# Patient Record
Sex: MALE | Race: BLACK OR AFRICAN AMERICAN | NOT HISPANIC OR LATINO | URBAN - METROPOLITAN AREA
[De-identification: names, ages, dates, MRNs, and addresses within clinical notes are randomized per-mention and may not be internally consistent; named-entity substitution may affect disease eponyms.]

---

## 2021-07-20 ENCOUNTER — EMERGENCY (EMERGENCY)
Facility: HOSPITAL | Age: 51
LOS: 1 days | Discharge: HOME | End: 2021-07-20
Attending: EMERGENCY MEDICINE | Admitting: EMERGENCY MEDICINE
Payer: COMMERCIAL

## 2021-07-20 VITALS
RESPIRATION RATE: 20 BRPM | HEIGHT: 75 IN | SYSTOLIC BLOOD PRESSURE: 156 MMHG | WEIGHT: 309.97 LBS | DIASTOLIC BLOOD PRESSURE: 86 MMHG | HEART RATE: 94 BPM | OXYGEN SATURATION: 98 % | TEMPERATURE: 98 F

## 2021-07-20 VITALS
RESPIRATION RATE: 17 BRPM | DIASTOLIC BLOOD PRESSURE: 73 MMHG | OXYGEN SATURATION: 98 % | HEART RATE: 72 BPM | TEMPERATURE: 98 F | SYSTOLIC BLOOD PRESSURE: 113 MMHG

## 2021-07-20 LAB
ALBUMIN SERPL ELPH-MCNC: 3.3 G/DL — SIGNIFICANT CHANGE UP (ref 3.3–5)
ALP SERPL-CCNC: 80 U/L — SIGNIFICANT CHANGE UP (ref 30–120)
ALT FLD-CCNC: 30 U/L DA — SIGNIFICANT CHANGE UP (ref 10–60)
ANION GAP SERPL CALC-SCNC: 7 MMOL/L — SIGNIFICANT CHANGE UP (ref 5–17)
APPEARANCE UR: CLEAR — SIGNIFICANT CHANGE UP
APTT BLD: 33.5 SEC — SIGNIFICANT CHANGE UP (ref 27.5–35.5)
AST SERPL-CCNC: 17 U/L — SIGNIFICANT CHANGE UP (ref 10–40)
BASOPHILS # BLD AUTO: 0.04 K/UL — SIGNIFICANT CHANGE UP (ref 0–0.2)
BASOPHILS NFR BLD AUTO: 0.5 % — SIGNIFICANT CHANGE UP (ref 0–2)
BILIRUB SERPL-MCNC: 0.4 MG/DL — SIGNIFICANT CHANGE UP (ref 0.2–1.2)
BILIRUB UR-MCNC: NEGATIVE — SIGNIFICANT CHANGE UP
BUN SERPL-MCNC: 11 MG/DL — SIGNIFICANT CHANGE UP (ref 7–23)
CALCIUM SERPL-MCNC: 8.9 MG/DL — SIGNIFICANT CHANGE UP (ref 8.4–10.5)
CHLORIDE SERPL-SCNC: 100 MMOL/L — SIGNIFICANT CHANGE UP (ref 96–108)
CO2 SERPL-SCNC: 25 MMOL/L — SIGNIFICANT CHANGE UP (ref 22–31)
COLOR SPEC: YELLOW — SIGNIFICANT CHANGE UP
CREAT SERPL-MCNC: 0.97 MG/DL — SIGNIFICANT CHANGE UP (ref 0.5–1.3)
DIFF PNL FLD: NEGATIVE — SIGNIFICANT CHANGE UP
EOSINOPHIL # BLD AUTO: 0.32 K/UL — SIGNIFICANT CHANGE UP (ref 0–0.5)
EOSINOPHIL NFR BLD AUTO: 4 % — SIGNIFICANT CHANGE UP (ref 0–6)
GLUCOSE BLDC GLUCOMTR-MCNC: 146 MG/DL — HIGH (ref 70–99)
GLUCOSE SERPL-MCNC: 147 MG/DL — HIGH (ref 70–99)
GLUCOSE UR QL: NEGATIVE MG/DL — SIGNIFICANT CHANGE UP
HCT VFR BLD CALC: 43.3 % — SIGNIFICANT CHANGE UP (ref 39–50)
HGB BLD-MCNC: 14.1 G/DL — SIGNIFICANT CHANGE UP (ref 13–17)
IMM GRANULOCYTES NFR BLD AUTO: 0.3 % — SIGNIFICANT CHANGE UP (ref 0–1.5)
INR BLD: 1.04 RATIO — SIGNIFICANT CHANGE UP (ref 0.88–1.16)
KETONES UR-MCNC: NEGATIVE — SIGNIFICANT CHANGE UP
LEUKOCYTE ESTERASE UR-ACNC: NEGATIVE — SIGNIFICANT CHANGE UP
LYMPHOCYTES # BLD AUTO: 3.26 K/UL — SIGNIFICANT CHANGE UP (ref 1–3.3)
LYMPHOCYTES # BLD AUTO: 40.8 % — SIGNIFICANT CHANGE UP (ref 13–44)
MCHC RBC-ENTMCNC: 28.3 PG — SIGNIFICANT CHANGE UP (ref 27–34)
MCHC RBC-ENTMCNC: 32.6 GM/DL — SIGNIFICANT CHANGE UP (ref 32–36)
MCV RBC AUTO: 86.9 FL — SIGNIFICANT CHANGE UP (ref 80–100)
MONOCYTES # BLD AUTO: 0.65 K/UL — SIGNIFICANT CHANGE UP (ref 0–0.9)
MONOCYTES NFR BLD AUTO: 8.1 % — SIGNIFICANT CHANGE UP (ref 2–14)
NEUTROPHILS # BLD AUTO: 3.7 K/UL — SIGNIFICANT CHANGE UP (ref 1.8–7.4)
NEUTROPHILS NFR BLD AUTO: 46.3 % — SIGNIFICANT CHANGE UP (ref 43–77)
NITRITE UR-MCNC: NEGATIVE — SIGNIFICANT CHANGE UP
NRBC # BLD: 0 /100 WBCS — SIGNIFICANT CHANGE UP (ref 0–0)
PH UR: 5 — SIGNIFICANT CHANGE UP (ref 5–8)
PLATELET # BLD AUTO: 295 K/UL — SIGNIFICANT CHANGE UP (ref 150–400)
POTASSIUM SERPL-MCNC: 4 MMOL/L — SIGNIFICANT CHANGE UP (ref 3.5–5.3)
POTASSIUM SERPL-SCNC: 4 MMOL/L — SIGNIFICANT CHANGE UP (ref 3.5–5.3)
PROT SERPL-MCNC: 7.2 G/DL — SIGNIFICANT CHANGE UP (ref 6–8.3)
PROT UR-MCNC: NEGATIVE MG/DL — SIGNIFICANT CHANGE UP
PROTHROM AB SERPL-ACNC: 12.6 SEC — SIGNIFICANT CHANGE UP (ref 10.6–13.6)
RBC # BLD: 4.98 M/UL — SIGNIFICANT CHANGE UP (ref 4.2–5.8)
RBC # FLD: 13 % — SIGNIFICANT CHANGE UP (ref 10.3–14.5)
SODIUM SERPL-SCNC: 132 MMOL/L — LOW (ref 135–145)
SP GR SPEC: 1 — LOW (ref 1.01–1.02)
TROPONIN I SERPL-MCNC: 0 NG/ML — LOW (ref 0.02–0.06)
UROBILINOGEN FLD QL: NEGATIVE MG/DL — SIGNIFICANT CHANGE UP
WBC # BLD: 7.99 K/UL — SIGNIFICANT CHANGE UP (ref 3.8–10.5)
WBC # FLD AUTO: 7.99 K/UL — SIGNIFICANT CHANGE UP (ref 3.8–10.5)

## 2021-07-20 PROCEDURE — 85610 PROTHROMBIN TIME: CPT

## 2021-07-20 PROCEDURE — 71045 X-RAY EXAM CHEST 1 VIEW: CPT | Mod: 26

## 2021-07-20 PROCEDURE — 36415 COLL VENOUS BLD VENIPUNCTURE: CPT

## 2021-07-20 PROCEDURE — 81003 URINALYSIS AUTO W/O SCOPE: CPT

## 2021-07-20 PROCEDURE — 85025 COMPLETE CBC W/AUTO DIFF WBC: CPT

## 2021-07-20 PROCEDURE — 70450 CT HEAD/BRAIN W/O DYE: CPT

## 2021-07-20 PROCEDURE — 71045 X-RAY EXAM CHEST 1 VIEW: CPT

## 2021-07-20 PROCEDURE — 99285 EMERGENCY DEPT VISIT HI MDM: CPT

## 2021-07-20 PROCEDURE — 85730 THROMBOPLASTIN TIME PARTIAL: CPT

## 2021-07-20 PROCEDURE — 80053 COMPREHEN METABOLIC PANEL: CPT

## 2021-07-20 PROCEDURE — 84484 ASSAY OF TROPONIN QUANT: CPT

## 2021-07-20 PROCEDURE — 93010 ELECTROCARDIOGRAM REPORT: CPT

## 2021-07-20 PROCEDURE — 82962 GLUCOSE BLOOD TEST: CPT

## 2021-07-20 PROCEDURE — 99284 EMERGENCY DEPT VISIT MOD MDM: CPT | Mod: 25

## 2021-07-20 PROCEDURE — 93005 ELECTROCARDIOGRAM TRACING: CPT

## 2021-07-20 PROCEDURE — 70450 CT HEAD/BRAIN W/O DYE: CPT | Mod: 26,MA

## 2021-07-20 RX ORDER — SODIUM CHLORIDE 9 MG/ML
500 INJECTION INTRAMUSCULAR; INTRAVENOUS; SUBCUTANEOUS ONCE
Refills: 0 | Status: COMPLETED | OUTPATIENT
Start: 2021-07-20 | End: 2021-07-20

## 2021-07-20 RX ADMIN — SODIUM CHLORIDE 500 MILLILITER(S): 9 INJECTION INTRAMUSCULAR; INTRAVENOUS; SUBCUTANEOUS at 10:51

## 2021-07-20 RX ADMIN — SODIUM CHLORIDE 500 MILLILITER(S): 9 INJECTION INTRAMUSCULAR; INTRAVENOUS; SUBCUTANEOUS at 11:06

## 2021-07-20 NOTE — ED PROVIDER NOTE - OBJECTIVE STATEMENT
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 Felt like he might pass out. Now feels better. Thinks he might have had an anxiety attack. Denies fever, headache, NV, visual disturbance, CP, SOB, diarrhea or other symptom. Did not take Metformin today.  PCP Eliazar in [facility].

## 2021-07-20 NOTE — ED PROVIDER NOTE - PATIENT PORTAL LINK FT
You can access the FollowMyHealth Patient Portal offered by [facility] by registering at the following website: [email or web address]. By joining [employer]’s FollowMyHealth portal, you will also be able to view your health information using other applications (apps) compatible with our system.

## 2021-07-20 NOTE — ED ADULT NURSE NOTE - OBJECTIVE STATEMENT
felt like I was going to pass out today at work, it's humid out and freaked out, have an appt with my doctor in few days

## 2021-07-20 NOTE — ED ADULT NURSE NOTE - NS ED NOTE ABUSE SUSPICION NEGLECT YN
Star Wedge Flap Text: The defect edges were debeveled with a #15 scalpel blade.  Given the location of the defect, shape of the defect and the proximity to free margins a star wedge flap was deemed most appropriate.  Using a sterile surgical marker, an appropriate rotation flap was drawn incorporating the defect and placing the expected incisions within the relaxed skin tension lines where possible. The area thus outlined was incised deep to adipose tissue with a #15 scalpel blade.  The skin margins were undermined to an appropriate distance in all directions utilizing iris scissors. No

## 2021-07-20 NOTE — ED PROVIDER NOTE - ENMT, MLM
Airway patent, Nasal mucosa clear. Mouth with normal mucosa. Throat has no vesicles, no oropharyngeal exudates and uvula is midline.  No nystagmus

## 2021-07-20 NOTE — ED PROVIDER NOTE - PROGRESS NOTE DETAILS
Feels well. Labs, CT, EKG normal. Plan - DC home with near syncope instructions. FU PCP tomorrow for another exam

## 2021-07-20 NOTE — ED ADULT NURSE NOTE - NSIMPLEMENTINTERV_GEN_ALL_ED
Implemented All Universal Safety Interventions:  [place] to call system. Call bell, personal items and telephone within reach. Instruct patient to call for assistance. Room bathroom lighting operational. Non-slip footwear when patient is off stretcher. Physically safe environment: no spills, clutter or unnecessary equipment. Stretcher in lowest position, wheels locked, appropriate side rails in place.

## 2022-04-08 NOTE — ED ADULT TRIAGE NOTE - INTERNATIONAL TRAVEL
04/07/22 1946   Vital Signs   Temp 97 °F (36.1 °C)   Temp Source Oral   Pulse 83   Heart Rate Source Monitor   Resp 20   BP 97/66   BP Location Right upper arm   Patient Position Semi fowlers   Level of Consciousness Alert (0)   MEWS Score 2   Oxygen Therapy   SpO2 97 %   O2 Device Nasal cannula   O2 Flow Rate (L/min) 2 L/min   Pt assessment complete. Pt lying in bed quietly. Lung sounds diminished. Pt on 02 2 liters. Bill catheter in place draining clear yellow urine. Rectal tube in place. No leakage noted. Nightly medications given with pudding. Pt denies any further needs at this time. Call light within reach. Bed exit alarm on. No

## 2022-05-02 ENCOUNTER — EMERGENCY (EMERGENCY)
Facility: HOSPITAL | Age: 52
LOS: 1 days | Discharge: HOME | End: 2022-05-02
Attending: EMERGENCY MEDICINE | Admitting: EMERGENCY MEDICINE
Payer: COMMERCIAL

## 2022-05-02 VITALS
WEIGHT: 315 LBS | DIASTOLIC BLOOD PRESSURE: 97 MMHG | TEMPERATURE: 98 F | SYSTOLIC BLOOD PRESSURE: 140 MMHG | HEART RATE: 122 BPM | HEIGHT: 75 IN | RESPIRATION RATE: 16 BRPM | OXYGEN SATURATION: 98 %

## 2022-05-02 VITALS
DIASTOLIC BLOOD PRESSURE: 81 MMHG | OXYGEN SATURATION: 98 % | HEART RATE: 102 BPM | SYSTOLIC BLOOD PRESSURE: 131 MMHG | RESPIRATION RATE: 20 BRPM | TEMPERATURE: 99 F

## 2022-05-02 PROCEDURE — 73630 X-RAY EXAM OF FOOT: CPT | Mod: 26,LT

## 2022-05-02 PROCEDURE — 73630 X-RAY EXAM OF FOOT: CPT

## 2022-05-02 PROCEDURE — 99283 EMERGENCY DEPT VISIT LOW MDM: CPT | Mod: 25

## 2022-05-02 PROCEDURE — 29515 APPLICATION SHORT LEG SPLINT: CPT | Mod: LT

## 2022-05-02 PROCEDURE — 99284 EMERGENCY DEPT VISIT MOD MDM: CPT | Mod: 57

## 2022-05-02 PROCEDURE — 28470 CLTX METATARSAL FX WO MNP EA: CPT | Mod: 54

## 2022-05-02 RX ORDER — METFORMIN HYDROCHLORIDE 850 MG/1
0 TABLET ORAL
Qty: 0 | Refills: 0 | DISCHARGE

## 2022-05-02 RX ORDER — OXYCODONE AND ACETAMINOPHEN 5; 325 MG/1; MG/1
1 TABLET ORAL ONCE
Refills: 0 | Status: DISCONTINUED | OUTPATIENT
Start: 2022-05-02 | End: 2022-05-02

## 2022-05-02 RX ORDER — ATORVASTATIN CALCIUM 80 MG/1
1 TABLET, FILM COATED ORAL
Qty: 0 | Refills: 0 | DISCHARGE

## 2022-05-02 RX ADMIN — OXYCODONE AND ACETAMINOPHEN 1 TABLET(S): 5; 325 TABLET ORAL at 09:46

## 2022-05-02 RX ADMIN — OXYCODONE AND ACETAMINOPHEN 1 TABLET(S): 5; 325 TABLET ORAL at 10:30

## 2022-05-02 NOTE — ED PROVIDER NOTE - PATIENT PORTAL LINK FT
You can access the FollowMyHealth Patient Portal offered by [facility] by registering at the following website: [email or web address]. By joining [employer]’s FollowMyHealth portal, you will also be able to view your health information using other applications (apps) compatible with our system.

## 2022-05-02 NOTE — ED PROVIDER NOTE - NSFOLLOWUPINSTRUCTIONS_ED_ALL_ED_FT
1) Follow-up with Orthopedics, See referred doctor. Call today for close, prompt follow-up.  2) Return to Emergency room for any worsening or persistent pain, weakness, numbness, fever, color change to extremity, or any other concerning symptoms.  3) See attached instruction sheets for additional information, including information regarding signs and symptoms to look out for, reasons to seek immediate care and other important instructions.  4) Rest, Ice, Elevate injured area  5) Wear splint as discussed, use crutches if given   6) Follow-up with your primary care doctor as well as soon as possible 1) Follow-up with Orthopedics, See referred doctor. Call today for close, prompt follow-up.  2) Return to Emergency room for any worsening or persistent pain, weakness, numbness, fever, color change to extremity, or any other concerning symptoms.  3) See attached instruction sheets for additional information, including information regarding signs and symptoms to look out for, reasons to seek immediate care and other important instructions.  4) Rest, Ice, Elevate injured area  5) Wear splint as discussed, use crutches  6) Follow-up with your primary care doctor as well as soon as possible 1) Follow-up with Orthopedics, See referred doctor. Call today for close, prompt follow-up.  2) Return to Emergency room for any worsening or persistent pain, weakness, numbness, fever, color change to extremity, or any other concerning symptoms.  3) See attached instruction sheets for additional information, including information regarding signs and symptoms to look out for, reasons to seek immediate care and other important instructions.  4) Rest, Ice, Elevate injured area  5) Wear splint as discussed, use crutches  6) Follow-up with your primary care doctor as well as soon as possible  7) Percocet as needed for pain, no driving

## 2022-05-02 NOTE — ED PROVIDER NOTE - MUSCULOSKELETAL, MLM
Spine appears normal, no spinal tend (c,t,l).  range of motion is not limited, no muscle or joint tenderness otherwise noted

## 2022-05-02 NOTE — ED PROVIDER NOTE - PHYSICAL EXAMINATION
L foot: pos mild swelling to distal aspect of L foot, distal metatarsals   Pos tend to dist mid / lat metatarsals. nl toes.  Nl dist str/sens equal bl, nl prox 5th mt. Nl anlke / tibfib / knee  2+ pulses, nl cap refill

## 2022-05-02 NOTE — ED ADULT NURSE NOTE - INTERVENTIONS DEFINITIONS
[place] to call system/Call bell, personal items and telephone within reach/Instruct patient to call for assistance/Stretcher in lowest position, wheels locked, appropriate side rails in place

## 2022-05-02 NOTE — ED PROVIDER NOTE - PROGRESS NOTE DETAILS
Rg Samayoa who reviewed XRs- splint, outpt fu in office  Discussed with Patient regarding the X-ray findings.  Discussed the risks of secondary fracture or ligamentous/tendon injury. Discussed the importance of close prompt follow-up with Orthopaedics for definitive workup and treatment, possible surgical repair of his inj.  Also discussed injury / neuro precautions.  Verbalization of understanding of all instructions was shown and an opportunity was given for questions.

## 2022-05-02 NOTE — ED PROVIDER NOTE - CARE PROVIDER_API CALL
Mauro Samayoa)  Orthopaedic Surgery  [address] [place] [address]  Phone: [phone]  Fax: [phone]  Follow Up Time:

## 2022-05-02 NOTE — ED PROVIDER NOTE - OBJECTIVE STATEMENT
53 yo M w/ hx DM p/w L foot pain sp stepped down onto wooden block. pt co pain to distal aspect of foot. no toe pain. no numb/ting/focal weak. mild swelling. occurred ~ 1.5 hours pta. no fall / head inj. no loc. No neck /back pain. no other acute co or inj. Pt vaccinated for covid.

## 2022-05-02 NOTE — ED ADULT TRIAGE NOTE - CHIEF COMPLAINT QUOTE
" I stepped on a wooden block and my left foot started hurting. This happened while at work this morning "

## 2023-03-29 ENCOUNTER — EMERGENCY (EMERGENCY)
Facility: HOSPITAL | Age: 53
LOS: 1 days | Discharge: HOME | End: 2023-03-29
Attending: EMERGENCY MEDICINE | Admitting: EMERGENCY MEDICINE
Payer: COMMERCIAL

## 2023-03-29 VITALS
TEMPERATURE: 99 F | HEIGHT: 75 IN | HEART RATE: 100 BPM | RESPIRATION RATE: 18 BRPM | SYSTOLIC BLOOD PRESSURE: 142 MMHG | WEIGHT: 309.97 LBS | DIASTOLIC BLOOD PRESSURE: 98 MMHG | OXYGEN SATURATION: 98 %

## 2023-03-29 PROCEDURE — 99284 EMERGENCY DEPT VISIT MOD MDM: CPT

## 2023-03-29 PROCEDURE — 99053 MED SERV 10PM-8AM 24 HR FAC: CPT

## 2023-03-29 PROCEDURE — 99284 EMERGENCY DEPT VISIT MOD MDM: CPT | Mod: 25

## 2023-03-29 PROCEDURE — 93971 EXTREMITY STUDY: CPT | Mod: 26,RT

## 2023-03-29 PROCEDURE — 93971 EXTREMITY STUDY: CPT

## 2023-03-29 NOTE — ED PROVIDER NOTE - CLINICAL SUMMARY MEDICAL DECISION MAKING FREE TEXT BOX
Swishing pain in right thigh.  Physical exam unrevealing, will rule out DVT otherwise likely musculoskeletal.  If Doppler negative follow-up orthopedics outpatient.

## 2023-03-29 NOTE — ED ADULT TRIAGE NOTE - NS ED TRIAGE AVPU SCALE
This evaluation was conducted via Zoom using secure and encrypted videoconferencing technology. The patient was in a private location in the [facility].    The patient's identity was confirmed and verbal consent was obtained for this virtual visit.     PSYCHIATRY FOLLOW-UP NOTE      Name: Kulwant Schultz  MRN: [id number]  : 1996  Age: 25 y.o.  Date of assessment: 2022  PCP: LOTTIE Valencia  Persons in attendance: Patient      REASON FOR VISIT/CHIEF COMPLAINT (as stated by Patient):  Kulwant Schultz is a 25 y.o., Unable to Obtain male, attending follow-up appointment for mood and anxiety management.      HISTORY OF PRESENT ILLNESS:  Kulwant Schultz is a 25 y.o. old male with MDD, ALICJA and ADHD comes in today for follow up. Patient was last seen 3 months ago, and following treatment planning recommendations were done:  · Increase Zoloft 100 mg daily for mood and anxiety management.  4 weeks supply given with 0 refill.  · Consider switch to Lexapro if diarrhea remains a limiting factor.  · Continue trazodone 25-50 mg at bedtime as needed for insomnia.  30 tabs for 50 mg dose with 0 refill given.  · Continue Vyvanse 30 mg daily for ADHD management.  30 tabs given with no refill.  · Patient signed the controlled medication treatment agreement in 2021.  · Continue psychotherapy in the clinic for mood and anxiety management.  · Motivated to cut down and stop cannabis use.    Patient is no longer on Zoloft for more than a month but reports stable mood despite losing his mother a month ago.  The patient is engaged in psychotherapy and reports improved focus on current dosing of Vyvanse.  Patient is using trazodone between 50mg to 75 mg with good response to sleep.  Patient is moving to [facility] in next month or so and agreed with plan of getting a 90-day supply but beginning to find a new psychiatrist in [place].    Patient is not interested in  antidepressant but understand the importance of considering short use of antidepressant with a new psychiatrist in the future if worsening of depression is noted.    CURRENT MEDICATIONS:  Current Outpatient Medications   Medication Sig Dispense Refill   • traZODone (DESYREL) 50 MG Tab Take 0.5 Tablets by mouth at bedtime as needed for Sleep (as needed for sleep). (can increase to 1 tab if needed for sleep_ 30 Tablet 2   • albuterol 108 (90 Base) MCG/ACT Aero Soln inhalation aerosol Inhale 2 Puffs every four hours as needed for Shortness of Breath. 1 Each 4   • Adapalene-Benzoyl Peroxide (EPIDUO) 0.1-2.5 % Gel Apply to acne prone areas nightly. 45 g 3   • Minoxidil (ROGAINE EX) Apply  topically.     • hydrOXYzine HCl (ATARAX) 25 MG Tab Take 0.5-1 Tabs by mouth 3 times a day as needed for Anxiety. 30 Tab 3   • Minocycline HCl Micronized (AMZEEQ) 4 % Foam by Apply externally route.     • diphenhydrAMINE (BENADRYL) 25 MG Tab Take 25 mg by mouth every 6 hours as needed for Sleep.       No current facility-administered medications for this visit.       MEDICAL HISTORY  Past Medical History:   Diagnosis Date   • ADD (attention deficit disorder)    • Asthma    • Concussion      No past surgical history on file.    PAST PSYCHIATRIC HISTORY  Prior psychiatric hospitalization: no  Prior Self harm/suicide attempt: no  Prior Diagnosis: ADD     PAST PSYCHIATRIC MEDICATIONS  · Celexa  · Wellbutrin  · Vyvanse  · Adderall  · Hydroxyzine      FAMILY HISTORY  Psychiatric diagnosis:  None diagnosed  History of suicide attempts:  no  Substance abuse history:  no     SUBSTANCE USE HISTORY:  ALCOHOL: occasional  TOBACCO: no  CANNABIS: 5 days/week  OPIOIDS: no  PRESCRIPTION MEDICATIONS: no  OTHERS: no  History of inpatient/outpatient rehab treatment: no     SOCIAL HISTORY  Childhood: born in [place]   Parents  when patient was young close to 2 years  Stayed close to mother while growing up  Education: College graduate  Was an  academic probation and school  Employment: Pharmaceutical safety assessment  Relationship: Single  Kids: None  Current living situation: Roommates   Current/past legal issues: denies  History of emotional/physical/sexual abuse - denies      REVIEW OF SYSTEMS:        Constitutional negative   Eyes negative   Ears/Nose/Mouth/Throat negative   Cardiovascular negative   Respiratory negative   Gastrointestinal negative   Genitourinary negative   Muscular negative   Integumentary negative   Neurological negative   Endocrine negative   Hematologic/Lymphatic negative     PHYSICAL EXAMINAION:  Vital signs: There were no vitals taken for this visit.  Musculoskeletal: Normal gait.   Abnormal movements: none      MENTAL STATUS EXAMINATION      General:   - Grooming and hygiene: Casual,   - Apparent distress: none,   - Behavior: Calm  - Eye Contact:  Good,   - no psychomotor agitation or retardation    - Participation: Active verbal participation  Orientation: Alert and Fully Oriented to person, place and time  Mood: Euthymic  Affect: Flexible and Full range,  Thought Process: Logical and Goal-directed  Thought Content: Denies suicidal or homicidal ideations, intent or plan Within normal limits  Perception: Denies auditory or visual hallucinations. No delusions noted Within normal limits  Attention span and concentration: Intact   Speech:Rate within normal limits and Volume within normal limits  Language: Appropriate   Insight: Good  Judgment: Good  Recent and remote memory: No gross evidence of memory deficits        DEPRESSION SCREENING:  Depression Screen (PHQ-2/PHQ-9) 2/4/2021 6/10/2021 7/23/2021   PHQ-2 Total Score 6 3 2   PHQ-9 Total Score 12 17 12       Interpretation of PHQ-9 Total Score   Score Severity   1-4 No Depression   5-9 Mild Depression   10-14 Moderate Depression   15-19 Moderately Severe Depression   20-27 Severe Depression    CURRENT RISK:       Suicidal: Low       Homicidal: Low       Self-Harm: Low        Relapse: Low       Crisis Safety Plan Reviewed Not Indicated       If evidence of imminent risk is present, intervention/plan:      MEDICAL RECORDS/LABS/DIAGNOSTIC TESTS REVIEWED:  No new lab since last visit     [place]  records -   Reviewed       DIAGNOSTIC IMPRESSION(S):  1. Major depressive disorder  2. ADHD  3. Generalized anxiety disorder  4. Cannabis abuse        PLAN:  (1) Major depressive disorder; (2) ADHD; (3) ALICJA  · Slow improvement  · Stop Zoloft: as patient has already done so for > 1 month.  · Consider Lexapro if worsening of depression is noted.  · Continue trazodone 50-75 mg at bedtime as needed for insomnia. 90 tabs for 50 mg dose with 2 refill given.  · Continue Vyvanse 30 mg daily for ADHD management.  30 tabs given with no refill for: 1/14-2/13; 2/14-3/16; 3/17-4/16.  · Patient signed the controlled medication treatment agreement in July 2021.  · Continue psychotherapy in the clinic for mood and anxiety management.  · Motivated to cut down and stop cannabis use.  · Medication options, alternatives (including no medications) and medication risks/benefits/side effects were discussed in detail.  · The patient was advised to call, message provider on [employer], or come in to the clinic if symptoms worsen or if any future questions/issues regarding their medications arise; the patient verbalized understanding and agreement.    · The patient was educated to call 911, call the suicide hotline, or go to local ER if having thoughts of suicide or homicide; verbalized understanding.      Billing Coding based on:  83998: based on MDM    Termination done.    The proposed treatment plan was discussed with the patient who was provided the opportunity to ask questions and make suggestions regarding alternative treatment. Patient verbalized understanding and expressed agreement with the plan.       Amol Hawkins M.D.  01/14/22    This note was created using voice recognition software (Dragon). The accuracy of the  Alert-The patient is alert, awake and responds to voice. The patient is oriented to time, place, and person. The triage nurse is able to obtain subjective information. dictation is limited by the abilities of the software. I have reviewed the note prior to signing, however some errors in grammar and context are still possible. If you have any questions related to this note please do not hesitate to contact our office.

## 2023-03-29 NOTE — ED PROVIDER NOTE - OBJECTIVE STATEMENT
53-year-old male with history of diabetes complaining of swishing feeling in right thigh today while at work.  States feels like blood flow is not normal.  Denies injury or precipitating event.  Currently asymptomatic says sensation of swishing in leg has gone away.  Non-smoker denies prior DVT history.  Had knee surgery years ago for torn ligament.  His PCP is Dr. Perea

## 2023-03-29 NOTE — ED PROVIDER NOTE - MUSCULOSKELETAL, MLM
Spine appears normal, range of motion is not limited, no muscle or joint tenderness. There is no Homans or cords or posterior calf tenderness.  Full range of motion pulses and sensation intact.  There good strong and symmetrical peripheral pulses in the right leg.

## 2023-06-01 ENCOUNTER — EMERGENCY (EMERGENCY)
Facility: HOSPITAL | Age: 53
LOS: 1 days | Discharge: HOME | End: 2023-06-01
Attending: EMERGENCY MEDICINE | Admitting: EMERGENCY MEDICINE
Payer: COMMERCIAL

## 2023-06-01 VITALS
SYSTOLIC BLOOD PRESSURE: 133 MMHG | TEMPERATURE: 99 F | OXYGEN SATURATION: 97 % | WEIGHT: 210.1 LBS | HEART RATE: 100 BPM | RESPIRATION RATE: 18 BRPM | DIASTOLIC BLOOD PRESSURE: 95 MMHG | HEIGHT: 75 IN

## 2023-06-01 VITALS
SYSTOLIC BLOOD PRESSURE: 133 MMHG | TEMPERATURE: 98 F | OXYGEN SATURATION: 99 % | RESPIRATION RATE: 17 BRPM | HEART RATE: 109 BPM | DIASTOLIC BLOOD PRESSURE: 84 MMHG

## 2023-06-01 PROCEDURE — 73030 X-RAY EXAM OF SHOULDER: CPT

## 2023-06-01 PROCEDURE — 72125 CT NECK SPINE W/O DYE: CPT | Mod: 26,MA

## 2023-06-01 PROCEDURE — 71046 X-RAY EXAM CHEST 2 VIEWS: CPT | Mod: 26

## 2023-06-01 PROCEDURE — 99284 EMERGENCY DEPT VISIT MOD MDM: CPT | Mod: 25

## 2023-06-01 PROCEDURE — 72110 X-RAY EXAM L-2 SPINE 4/>VWS: CPT

## 2023-06-01 PROCEDURE — 99285 EMERGENCY DEPT VISIT HI MDM: CPT

## 2023-06-01 PROCEDURE — 73030 X-RAY EXAM OF SHOULDER: CPT | Mod: 26,LT

## 2023-06-01 PROCEDURE — 71046 X-RAY EXAM CHEST 2 VIEWS: CPT

## 2023-06-01 PROCEDURE — 72125 CT NECK SPINE W/O DYE: CPT | Mod: MA

## 2023-06-01 PROCEDURE — 72110 X-RAY EXAM L-2 SPINE 4/>VWS: CPT | Mod: 26
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
## 2023-06-01 NOTE — ED PROVIDER NOTE - NSFOLLOWUPINSTRUCTIONS_ED_ALL_ED_FT
1. Follow-up with your Primary Medical Doctor or referred doctor. Call today / next business day for prompt follow-up.  2. Return to Emergency room for any worsening or persistent pain, shortness of breath, chest pains, abdominal pain, numbness, tingling, headaches, vomiting, visual changes, dizziness, weakness, fever, or any other concerning symptoms.  3. See attached instruction sheets for additional information, including information regarding signs and symptoms to look out for, reasons to seek immediate care and other important instructions.  4. Follow-up with orthopedics, call tomorrow for prompt follow-up  5.  Motrin or Tylenol as needed for pain, with food 1. Follow-up with your Primary Medical Doctor or referred doctor. Call today / next business day for prompt follow-up. [phone]  2. Return to Emergency room for any worsening or persistent pain, shortness of breath, chest pains, abdominal pain, numbness, tingling, headaches, vomiting, visual changes, dizziness, weakness, fever, or any other concerning symptoms.  3. See attached instruction sheets for additional information, including information regarding signs and symptoms to look out for, reasons to seek immediate care and other important instructions.  4. Follow-up with orthopedics, call tomorrow for prompt follow-up - [phone]  5.  Motrin or Tylenol as needed for pain, with food

## 2023-06-01 NOTE — ED PROVIDER NOTE - PHYSICAL EXAMINATION
· CONSTITUTIONAL: Well appearing, well nourished, awake, alert, oriented to person, place, time/situation and in no apparent distress. non-toxic, well appearing.   · ENMT: Airway patent, Nasal mucosa clear. Mouth with normal mucosa. Throat has no vesicles, no oropharyngeal exudates and uvula is midline. MM moist.  no external signs of head trauma.  · EYES: Clear bilaterally, pupils equal, round and reactive to light. Extra-ocular muscles intact.  · CARDIAC: Normal rate, regular rhythm.  Heart sounds S1, S2.  No murmurs, rubs or gallops.  · RESPIRATORY: Breath sounds clear and equal bilaterally. nl resp effort.  No Wheeze / Rhonci / Rales.  No acute chest wall tenderness.  · GASTROINTESTINAL: Abdomen soft, non-tender, no guarding. non-distended. no hsm. no CVA tenderness. no acute signs of truncal trauma.  · GENITOURINARY:  Bladder: non-tender / non-distended  · MUSCULOSKELETAL: Spine appears normal, No midline spinal tenderness (Cervical, thoracic, Lumbar).  Positive bilateral paraspinal lower cervical tenderness.  Mild paraspinal lumbar tenderness as well.  No acute edema.  Range of motion in all extremities is not limited, no muscle or joint tenderness except as noted  Left shoulder with mild tenderness to anterior left shoulder, full range of motion with minimal pain.  Normal distal strength and sensation equal bilaterally.  · NEUROLOGICAL: Alert and oriented, no focal deficits, no motor or sensory deficits. Normal, non-focal detailed neurologic exam.  · SKIN: Skin normal color for race, warm, dry and intact. No evidence of rash.  · HEME LYMPH: No adenopathy or splenomegaly.
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
## 2023-06-01 NOTE — ED PROVIDER NOTE - CARE PROVIDER_API CALL
Kristin Mascorro  Internal Medicine  [address] [place] [id number]  Phone: [phone]  Fax: [phone]  Follow Up Time:     Reinaldo Fuentes  Orthopaedic Surgery  [facility], [address] [place] [id number]  Phone: [phone]  Fax: [phone]  Follow Up Time:

## 2023-06-01 NOTE — ED ADULT NURSE NOTE - NS ED NOTE ABUSE SUSPICION NEGLECT YN
Medication:    Outpatient Medications Marked as Taking for the 1/18/19 encounter (Refill) with Francois Carl MD   Medication Sig Dispense Refill   â¢ methylphenidate (RITALIN) 20 MG tablet Take 1 tablet by mouth 3 times daily. Due 12/11/18 90 tablet 0       Pharmacy has been verified.     [x] Patient completely out of medication     Patient currently has follow up appointment scheduled    Message to Prescriber: No

## 2023-06-01 NOTE — ED ADULT NURSE NOTE - NSFALLUNIVINTERV_ED_ALL_ED
Bed/Stretcher in lowest position, wheels locked, appropriate side rails in place/Call bell, personal items and telephone in reach/Instruct patient to call for assistance before getting out of bed/chair/stretcher/Non-slip footwear applied when patient is off stretcher/[place] to call system/Physically safe environment - no spills, clutter or unnecessary equipment/Purposeful proactive rounding/Room/bathroom lighting operational, light cord in reach

## 2023-06-01 NOTE — ED PROVIDER NOTE - DIFFERENTIAL DIAGNOSIS
Rule out acute shoulder fracture, pneumothorax, lumbar spine fracture, cervical spine fracture, other acute pathology Differential Diagnosis
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
## 2023-06-01 NOTE — ED PROVIDER NOTE - PATIENT PORTAL LINK FT
You can access the FollowMyHealth Patient Portal offered by [facility] by registering at the following website: [email or web address]. By joining [facility]’s FollowMyHealth portal, you will also be able to view your health information using other applications (apps) compatible with our system.
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
## 2023-07-03 NOTE — ED PROVIDER NOTE - WR INTERPRETED BY 3
Alert-The patient is alert, awake and responds to voice. The patient is oriented to time, place, and person. The triage nurse is able to obtain subjective information. Terence Rosenbaum

## 2024-10-21 NOTE — ED ADULT NURSE NOTE - NS ED NURSE LEVEL OF CONSCIOUSNESS AFFECT
[facility]  Calm Anxious

## 2025-01-21 NOTE — ED ADULT NURSE NOTE - NURSING NEURO ORIENTATION
- Stable on Trazodone 50 mg as needed at bedtime. Continue same.   - Will continue to monitor.       oriented to person, place and time